# Patient Record
Sex: FEMALE | Race: WHITE | URBAN - METROPOLITAN AREA
[De-identification: names, ages, dates, MRNs, and addresses within clinical notes are randomized per-mention and may not be internally consistent; named-entity substitution may affect disease eponyms.]

---

## 2017-01-20 ENCOUNTER — TELEPHONE (OUTPATIENT)
Dept: FAMILY MEDICINE | Facility: CLINIC | Age: 62
End: 2017-01-20

## 2017-01-20 DIAGNOSIS — G89.4 CHRONIC PAIN SYNDROME: Primary | ICD-10-CM

## 2017-01-20 NOTE — TELEPHONE ENCOUNTER
MP  Patient failing on chronic pain quality list because chronic pain or chronic pain syndrome not listed on problem list.  Please advise.  Thanks, Lara Hough RN

## 2017-03-01 DIAGNOSIS — M15.0 PRIMARY OSTEOARTHRITIS INVOLVING MULTIPLE JOINTS: ICD-10-CM

## 2017-03-01 DIAGNOSIS — M54.50 CHRONIC BILATERAL LOW BACK PAIN WITHOUT SCIATICA: ICD-10-CM

## 2017-03-01 DIAGNOSIS — G89.29 CHRONIC BILATERAL LOW BACK PAIN WITHOUT SCIATICA: ICD-10-CM

## 2017-03-01 DIAGNOSIS — G89.4 CHRONIC PAIN SYNDROME: ICD-10-CM

## 2017-03-01 NOTE — TELEPHONE ENCOUNTER
Reason for Call:  Medication or medication refill:    Do you use a [facility]? Sabin of the pharmacy and phone number for the current request:  N/A    Name of the medication requested: Percocet    Other request: patient has made her pain med follow up for 3/14/17, but needs  A temp refill on her percocet until her appt has 3 pills left. Please let her know    Can we leave a detailed message on this number? YES    Phone number patient can be reached at: Home number on file [phone] (home)    Best Time: today    Call taken on 3/1/2017 at 8:41 AM by iAlyn Alexandra

## 2017-03-02 RX ORDER — OXYCODONE AND ACETAMINOPHEN 5; 325 MG/1; MG/1
1 TABLET ORAL EVERY 8 HOURS PRN
Qty: 30 TABLET | Refills: 0 | Status: CANCELLED | OUTPATIENT
Start: 2017-03-02

## 2017-03-02 RX ORDER — OXYCODONE AND ACETAMINOPHEN 5; 325 MG/1; MG/1
1 TABLET ORAL EVERY 4 HOURS PRN
Qty: 30 TABLET | Refills: 0 | Status: SHIPPED | OUTPATIENT
Start: 2017-03-02 | End: 2017-03-14

## 2017-03-02 NOTE — TELEPHONE ENCOUNTER
STEVE  Controlled Substance Refill Request for Percocet    Last refill: 2/1/2017 - #35    Last clinic visit: 12/1/2016     Clinic visit frequency required: Note from 12/1/16: Assessment and plan: Chronic pain. I agreed after discussion to raise the number to 35 for the next 3 months and then after that I will go back down to 30 pills a month. I will see her in 3 months.      Next appt: 3/14/2017    Controlled substance agreement on file: Yes:  Date 12/1/2016.    Documentation in problem list reviewed:  Yes    Processing:  Message below doesn't say if she will  or wants mailed will call patient    RX [place] reviewed:  reviewed- no concerns  [place]  [email or web address]  Please authorize if appropriate.  Thanks, Lara Hough RN

## 2017-03-07 ENCOUNTER — TELEPHONE (OUTPATIENT)
Dept: FAMILY MEDICINE | Facility: CLINIC | Age: 62
End: 2017-03-07

## 2017-03-14 ENCOUNTER — OFFICE VISIT (OUTPATIENT)
Dept: FAMILY MEDICINE | Facility: CLINIC | Age: 62
End: 2017-03-14
Payer: MEDICARE

## 2017-03-14 VITALS
DIASTOLIC BLOOD PRESSURE: 90 MMHG | TEMPERATURE: 97.4 F | BODY MASS INDEX: 38.61 KG/M2 | HEIGHT: 67 IN | WEIGHT: 246 LBS | OXYGEN SATURATION: 100 % | HEART RATE: 100 BPM | SYSTOLIC BLOOD PRESSURE: 140 MMHG | RESPIRATION RATE: 16 BRPM

## 2017-03-14 DIAGNOSIS — M54.50 CHRONIC BILATERAL LOW BACK PAIN WITHOUT SCIATICA: ICD-10-CM

## 2017-03-14 DIAGNOSIS — G43.009 MIGRAINE WITHOUT AURA AND WITHOUT STATUS MIGRAINOSUS, NOT INTRACTABLE: ICD-10-CM

## 2017-03-14 DIAGNOSIS — M15.0 PRIMARY OSTEOARTHRITIS INVOLVING MULTIPLE JOINTS: ICD-10-CM

## 2017-03-14 DIAGNOSIS — G44.209 MUSCLE CONTRACTION HEADACHE: ICD-10-CM

## 2017-03-14 DIAGNOSIS — K21.9 GASTROESOPHAGEAL REFLUX DISEASE WITHOUT ESOPHAGITIS: ICD-10-CM

## 2017-03-14 DIAGNOSIS — G89.29 CHRONIC BILATERAL LOW BACK PAIN WITHOUT SCIATICA: ICD-10-CM

## 2017-03-14 PROCEDURE — 99214 OFFICE O/P EST MOD 30 MIN: CPT | Performed by: FAMILY MEDICINE

## 2017-03-14 RX ORDER — SUMATRIPTAN 50 MG/1
50 TABLET, FILM COATED ORAL
Qty: 9 TABLET | Refills: 1 | Status: SHIPPED | OUTPATIENT
Start: 2017-03-14

## 2017-03-14 RX ORDER — OXYCODONE AND ACETAMINOPHEN 5; 325 MG/1; MG/1
1 TABLET ORAL EVERY 4 HOURS PRN
Qty: 30 TABLET | Refills: 0 | Status: SHIPPED | OUTPATIENT
Start: 2017-03-14 | End: 2017-06-21

## 2017-03-14 RX ORDER — ESOMEPRAZOLE MAGNESIUM 40 MG/1
40 CAPSULE, DELAYED RELEASE ORAL
Qty: 90 CAPSULE | Status: SHIPPED | OUTPATIENT
Start: 2017-03-14

## 2017-03-14 RX ORDER — OXYCODONE AND ACETAMINOPHEN 5; 325 MG/1; MG/1
1-2 TABLET ORAL EVERY 6 HOURS PRN
Qty: 30 TABLET | Refills: 0 | Status: SHIPPED | OUTPATIENT
Start: 2017-03-14

## 2017-03-14 RX ORDER — OXYCODONE AND ACETAMINOPHEN 5; 325 MG/1; MG/1
1 TABLET ORAL EVERY 4 HOURS PRN
Qty: 30 TABLET | Refills: 0 | Status: SHIPPED | OUTPATIENT
Start: 2017-03-14

## 2017-03-14 RX ORDER — CYCLOBENZAPRINE HCL 10 MG
TABLET ORAL
Qty: 180 TABLET | Status: SHIPPED | OUTPATIENT
Start: 2017-03-14

## 2017-03-14 NOTE — MR AVS SNAPSHOT
"              After Visit Summary   3/14/2017    Aleksandra Herzog    MRN: [id number]           Patient Information     Date Of Birth          1955        Visit Information        Provider Department      3/14/2017 11:00 AM Jun Senior MD [facility]        Today's Diagnoses     Primary osteoarthritis involving multiple joints        Migraine without aura and without status migrainosus, not intractable        Muscle contraction headache        Chronic bilateral low back pain without sciatica        Gastroesophageal reflux disease without esophagitis           Follow-ups after your visit        Who to contact     If you have questions or need follow up information about today's clinic visit or your schedule please contact [facility] directly at [phone].  Normal or non-critical lab and imaging results will be communicated to you by MyChart, letter or phone within 4 business days after the clinic has received the results. If you do not hear from us within 7 days, please contact the clinic through MyChart or phone. If you have a critical or abnormal lab result, we will notify you by phone as soon as possible.  Submit refill requests through [employer] or call your pharmacy and they will forward the refill request to us. Please allow 3 business days for your refill to be completed.          Additional Information About Your Visit        MyChart Information     GO-SIM lets you send messages to your doctor, view your test results, renew your prescriptions, schedule appointments and more. To sign up, go to [email or web address] . Click on \"Log in\" on the left side of the screen, which will take you to the Welcome page. Then click on \"Sign up Now\" on the right side of the page.     You will be asked to enter the access code listed below, as well as some personal information. Please follow the directions to create your username and password.     Your access code is: " "[email or web address]  Expires: 2017 11:26 AM     Your access code will  in 90 days. If you need help or a new code, please call your [facility] or [phone].        Care EveryWhere ID     This is your Care EveryWhere ID. This could be used by other organizations to access your [facility] medical records  [id number]        Your Vitals Were     Pulse Temperature Respirations Height Pulse Oximetry BMI (Body Mass Index)    100 97.4  F (36.3  C) (Oral) 16 5' 7\" (1.702 m) 100% 38.53 kg/m2       Blood Pressure from Last 3 Encounters:   17 140/90   16 (!) 142/94   16 134/90    Weight from Last 3 Encounters:   17 246 lb (111.6 kg)   16 246 lb (111.6 kg)   16 246 lb (111.6 kg)              Today, you had the following     No orders found for display         Where to get your medicines      These medications were sent to [facility] [address] [place] [address] AT [address] [address]    Hours:  24-hours Phone:  [phone]     cyclobenzaprine 10 MG tablet    esomeprazole 40 MG CR capsule    SUMAtriptan 50 MG tablet         Some of these will need a paper prescription and others can be bought over the counter.  Ask your nurse if you have questions.     Bring a paper prescription for each of these medications     isometheptene-dichloralphenazone-acetaminophen -325 MG per capsule    oxyCODONE-acetaminophen 5-325 MG per tablet    oxyCODONE-acetaminophen 5-325 MG per tablet    oxyCODONE-acetaminophen 5-325 MG per tablet          Primary Care Provider Office Phone # Fax #    Jun Senior [phone]       [facility] [address] [address]        Thank you!     Thank you for choosing [facility]  for your care. Our goal is always to provide you with excellent care. Hearing back from our patients is one way we can continue to improve our services. Please take " a few minutes to complete the written survey that you may receive in the mail after your visit with us. Thank you!             Your Updated Medication List - Protect others around you: Learn how to safely use, store and throw away your medicines at www.disposemymeds.org.          This list is accurate as of: 3/14/17 11:26 AM.  Always use your most recent med list.                   Brand Name Dispense Instructions for use    albuterol 108 (90 BASE) MCG/ACT Inhaler    PROAIR HFA/PROVENTIL HFA/VENTOLIN HFA    1 Inhaler    Inhale 2 puffs into the lungs every 6 hours as needed for shortness of breath / dyspnea       BUSPAR 10 MG tablet   Generic drug:  busPIRone     180 Tab    2 tabs three times a day       clonazePAM 0.5 MG tablet    klonoPIN         cyclobenzaprine 10 MG tablet    FLEXERIL    180 tablet    Take  by mouth. 1-2 ONCE OR TWICE DAILY       esomeprazole 40 MG CR capsule    nexIUM    90 capsule    Take 1 capsule (40 mg) by mouth every morning (before breakfast) Take 30-60 minutes before a eating.       gabapentin 600 MG tablet    NEURONTIN    150 tablet    Take  by mouth. Five capsules daily for osteoarthritis       isometheptene-dichloralphenazone-acetaminophen -325 MG per capsule    MIDRIN    60 capsule    Take 1 capsule by mouth every 4 hours as needed for headaches       order for DME     1 Units    Equipment being ordered: home traction unit       * oxyCODONE-acetaminophen 5-325 MG per tablet    PERCOCET    30 tablet    Take 1 tablet by mouth every 4 hours as needed for moderate to severe pain       * oxyCODONE-acetaminophen 5-325 MG per tablet    PERCOCET    30 tablet    Take 1 tablet by mouth every 4 hours as needed for moderate to severe pain       * oxyCODONE-acetaminophen 5-325 MG per tablet    PERCOCET    30 tablet    Take 1-2 tablets by mouth every 6 hours as needed for moderate to severe pain       SUMAtriptan 50 MG tablet    IMITREX    9 tablet    Take 1 tablet (50 mg) by mouth at onset  of headache for migraine May repeat dose in 2 hours.  Do not exceed 200 mg in 24 hours       VIIBRYD 20 MG Tabs tablet   Generic drug:  vilazodone      Take 1 tablet (20 mg) by mouth daily       * Notice:  This list has 3 medication(s) that are the same as other medications prescribed for you. Read the directions carefully, and ask your doctor or other care provider to review them with you.

## 2017-03-14 NOTE — PROGRESS NOTES
Subjective:three-month follow-up for chronic pain. She got a prescription 2 weeks ago for the next month but she is having a pain exacerbation in her low back right now, has used more than half of them and I agreed today to have the next refill started on the 20th of this month and then every month she can get 30 pills. She has been staying to our agreement of 30 pills per month for the most part, occasionally has asked for extra, does understand that she needs to work out a plan with the next doctor taking over for me. She has chronic severe depression, understands that if she has suicidal thoughts she really needs to talk to her psychiatrist rather than us, usually won't fill out her PHQ 9 for that reason. She knows that she is overdue for a number preventative healthcare things but she had a death in the family recently, her mother's sister, has been very difficult for her, happened in January. She doesn't feel ready to take up any of those preventative care things right now. She wants to wean off of the gabapentin because she thinks that it may be contributing to her increased sweating which really is a problem in the summer. It's much more tolerable in the winter.    Objective:  Normal thought processes and range of affect.    Assessment and plan: Chronic pain, multiple other issues, refilled medications, we'll plan on coming back in 3 months either here or she may switch to a different clinic, is considering going back to a pain clinic as well.    Over 25 min was spent with pt, and over half was in counseling

## 2017-03-15 ASSESSMENT — PATIENT HEALTH QUESTIONNAIRE - PHQ9: SUM OF ALL RESPONSES TO PHQ QUESTIONS 1-9: 27

## 2017-03-23 DIAGNOSIS — G43.009 MIGRAINE WITHOUT AURA AND WITHOUT STATUS MIGRAINOSUS, NOT INTRACTABLE: Primary | ICD-10-CM

## 2017-03-27 ENCOUNTER — TELEPHONE (OUTPATIENT)
Dept: FAMILY MEDICINE | Facility: CLINIC | Age: 62
End: 2017-03-27

## 2017-03-27 NOTE — TELEPHONE ENCOUNTER
Summary:    Patient is due/failing the following:   FIT, MAMMOGRAM and PAP    Type of outreach:  Sent letter.  Action needed: Patient needs office visit for physical/pap.    If need for provider review:    Please indicate OV, lab, MTM, or nurse appt if needed.  Indicate fasting or not fasting.                                                                                                                                          Rodney Birmingham ma

## 2017-03-27 NOTE — LETTER
[facility]  [address], [address]  [place], [place] [address]  [phone]     March 27, 2017     Aleksandra Herzog                                                                                                                               [address]     [facility] [address]        Dear Aleksandra,    We have noticed that you are currently overdue for your mammogram,pap smear and fit test( a test for colon cancer).    If you have had a mammogram in the past 2 years, please let us know so, we can update our records. You can send us a message via [employer] or call the clinic at [phone] to give us an update.     If you have not had a mammogram, we encourage you to schedule by contacting the   [facility] at [phone], or the [facility] at [phone].     If you are under/uninsured, we recommend you contact the [facility]. They offer mammograms on a sliding fee charge. You can schedule with them at [phone]. Ask them to send us the results.     Please make a appointment with  for the physical/pap [phone]    Please contact the clinic with any questions.    Sincerely,    Care Team for Jun Senior MD/mlb        Sincerely,      Clinic hours:  Monday   7:30 AM - 5:00 PM    Tuesday  7:00 AM - 7:00 PM    Wednesday  7:00 AM - 5:00 PM    Thursday  7:30 AM - 7:00 PM    Friday   7:30 AM - 5:00 PM

## 2017-05-05 DIAGNOSIS — G89.4 CHRONIC PAIN SYNDROME: ICD-10-CM

## 2017-05-05 RX ORDER — GABAPENTIN 600 MG/1
TABLET ORAL
Qty: 150 TABLET | Refills: 11 | Status: SHIPPED | OUTPATIENT
Start: 2017-05-05

## 2017-05-05 NOTE — TELEPHONE ENCOUNTER
Gabapentin      Last Written Prescription Date:  5-3-16  Last Fill Quantity: 150,   # refills: PRN  Last Office Visit with [facility], [facility] or  Health prescribing provider: 3-14-17  Future Office visit:       Routing refill request to provider for review/approval because:  Drug not on the [facility], [facility] or  Health refill protocol or controlled substance

## 2017-05-05 NOTE — TELEPHONE ENCOUNTER
MP,    Controlled Substance Refill Request for Gabapentin    Last refill: 3/30/2017 #150    Last clinic visit: 3/14/2017     Clinic visit frequency required: Q 3 months  Next appt: none    Controlled substance agreement on file: Yes:  Date 12/1/2016.    Documentation in problem list reviewed:  Yes    Processing:  [facility]    RX monitoring program ([place]) reviewed:  reviewed- no concerns  [place]  [email or web address]    Please authorize if appropriate.  Thanks,  Karen CONNOR RN

## 2017-05-05 NOTE — TELEPHONE ENCOUNTER
Pending Prescriptions:                       Disp   Refills    gabapentin (NEURONTIN) 600 MG tablet [Phar*150 ta*0        Sig: TAKE 5 TABLETS BY MOUTH DAILY          Last Written Prescription Date: 05/03/2017  Last Fill Quantity: 150,  # refills: PRN   Last Office Visit with [facility], [facility] or [facility] prescribing provider: 03/14/2017

## 2017-05-16 DIAGNOSIS — G44.209 MUSCLE CONTRACTION HEADACHE: ICD-10-CM

## 2017-05-16 NOTE — TELEPHONE ENCOUNTER
Reason for Call:  Medication or medication refill:    Do you use a [facility]?  Name of the pharmacy and phone number for the current request:  [facility] [address] [place] [address] AT [facility]      Name of the medication requested: isometheptene-dichloralphenazone-acetaminophen (MIDRIN)    Other request: pt needs rx refill for isometheptene-dichloralphenazone-acetaminophen (MIDRIN). Please advise    Can we leave a detailed message on this number? YES    Phone number patient can be reached at: Home number on file [phone] (home)    Best Time: any    Call taken on 5/16/2017 at 2:46 PM by Stanford Skaggs

## 2017-05-16 NOTE — TELEPHONE ENCOUNTER
Controlled Substance Refill Request for Midrin  Problem List Complete:  Yes  Medication(s): percocet   Maximum quantity per month: 30   Clinic visit frequency required: Q 3 months      Controlled substance agreement:      Encounter-Level CSA - 12/1/16:                   Controlled Substance Agreement - Scan on 12/14/2016 12:26 PM : CONTROLLED SUBSTANCE AGREEMENT (below)          Pain Clinic evaluation in the past: No     DIRE Total Score(s):  No flowsheet data found.     Last [facility] website verification: done on 5/5/2017    Routing refill request to provider for review/approval because:  Drug not on the [facility], [facility] or  Health refill protocol or controlled substance    Yajaira Anthony RN - Triage [employer]

## 2017-05-23 ENCOUNTER — TELEPHONE (OUTPATIENT)
Dept: FAMILY MEDICINE | Facility: CLINIC | Age: 62
End: 2017-05-23

## 2017-05-23 NOTE — TELEPHONE ENCOUNTER
Do we have an option of trying for PA? It IS indicated for muscle dontraction headaches I believe and the other option is more narcotics which we are trying to avoid

## 2017-05-23 NOTE — TELEPHONE ENCOUNTER
Ирина denied coverage of Midrin.  Reason: Medication not FDA approved for this dx.  Ирина ph# [phone]  ID# [id number]  Gillian ph# [phone]

## 2017-05-26 NOTE — TELEPHONE ENCOUNTER
Spoke with pharmacist at [facility].  Medication is excluded for coverage by Medicare Part D by law.  She was very firm. No exceptions.

## 2017-05-26 NOTE — TELEPHONE ENCOUNTER
Please let pt know we tried but they will not cover it-would have to pay out of pocket. There is nothing similar I can rx

## 2017-06-03 ENCOUNTER — HEALTH MAINTENANCE LETTER (OUTPATIENT)
Age: 62
End: 2017-06-03

## 2017-06-21 ENCOUNTER — OFFICE VISIT (OUTPATIENT)
Dept: FAMILY MEDICINE | Facility: CLINIC | Age: 62
End: 2017-06-21
Payer: MEDICARE

## 2017-06-21 VITALS
SYSTOLIC BLOOD PRESSURE: 160 MMHG | TEMPERATURE: 98.3 F | DIASTOLIC BLOOD PRESSURE: 100 MMHG | WEIGHT: 256.5 LBS | HEART RATE: 114 BPM | BODY MASS INDEX: 40.26 KG/M2 | OXYGEN SATURATION: 97 % | HEIGHT: 67 IN

## 2017-06-21 DIAGNOSIS — J45.31 MILD PERSISTENT ASTHMA WITH EXACERBATION: ICD-10-CM

## 2017-06-21 DIAGNOSIS — J98.01 BRONCHOSPASM: Primary | ICD-10-CM

## 2017-06-21 DIAGNOSIS — I10 BENIGN ESSENTIAL HYPERTENSION: ICD-10-CM

## 2017-06-21 DIAGNOSIS — Z12.11 SCREEN FOR COLON CANCER: ICD-10-CM

## 2017-06-21 DIAGNOSIS — Z72.0 TOBACCO ABUSE: ICD-10-CM

## 2017-06-21 DIAGNOSIS — M15.0 PRIMARY OSTEOARTHRITIS INVOLVING MULTIPLE JOINTS: ICD-10-CM

## 2017-06-21 DIAGNOSIS — Z12.39 BREAST CANCER SCREENING: ICD-10-CM

## 2017-06-21 DIAGNOSIS — K21.9 GASTROESOPHAGEAL REFLUX DISEASE WITHOUT ESOPHAGITIS: ICD-10-CM

## 2017-06-21 DIAGNOSIS — F17.200 TOBACCO USE DISORDER: ICD-10-CM

## 2017-06-21 DIAGNOSIS — C50.411 MALIGNANT NEOPLASM OF UPPER-OUTER QUADRANT OF RIGHT FEMALE BREAST, UNSPECIFIED ESTROGEN RECEPTOR STATUS (H): ICD-10-CM

## 2017-06-21 DIAGNOSIS — Z11.59 NEED FOR HEPATITIS C SCREENING TEST: ICD-10-CM

## 2017-06-21 DIAGNOSIS — Z12.4 SCREENING FOR MALIGNANT NEOPLASM OF CERVIX: ICD-10-CM

## 2017-06-21 PROCEDURE — 80048 BASIC METABOLIC PNL TOTAL CA: CPT | Performed by: FAMILY MEDICINE

## 2017-06-21 PROCEDURE — 84443 ASSAY THYROID STIM HORMONE: CPT | Performed by: FAMILY MEDICINE

## 2017-06-21 PROCEDURE — 99214 OFFICE O/P EST MOD 30 MIN: CPT | Performed by: FAMILY MEDICINE

## 2017-06-21 PROCEDURE — 36415 COLL VENOUS BLD VENIPUNCTURE: CPT | Performed by: FAMILY MEDICINE

## 2017-06-21 PROCEDURE — G0472 HEP C SCREEN HIGH RISK/OTHER: HCPCS | Performed by: FAMILY MEDICINE

## 2017-06-21 PROCEDURE — 86803 HEPATITIS C AB TEST: CPT | Performed by: FAMILY MEDICINE

## 2017-06-21 RX ORDER — OXYCODONE AND ACETAMINOPHEN 5; 325 MG/1; MG/1
1 TABLET ORAL DAILY PRN
Qty: 30 TABLET | Refills: 0 | Status: SHIPPED | OUTPATIENT
Start: 2017-07-21 | End: 2017-06-21

## 2017-06-21 RX ORDER — OXYCODONE AND ACETAMINOPHEN 5; 325 MG/1; MG/1
1 TABLET ORAL DAILY PRN
Qty: 30 TABLET | Refills: 0 | Status: SHIPPED | OUTPATIENT
Start: 2017-08-21

## 2017-06-21 RX ORDER — OXYCODONE AND ACETAMINOPHEN 5; 325 MG/1; MG/1
1 TABLET ORAL DAILY PRN
Qty: 30 TABLET | Refills: 0 | Status: SHIPPED | OUTPATIENT
Start: 2017-06-21 | End: 2017-06-21

## 2017-06-21 NOTE — PROGRESS NOTES
SUBJECTIVE:                                                    Aleksandra Herzog is a 62 year old female who presents to clinic today for the following health issues:    She reports she is used to getting prescription every 3 months from  Previous primary care physicain  Wants to continue same  No other medications help  Takes it responsibly.  She has been staying to an  agreement of 30 pills per month for the most part,   occasionally has asked for extra,     The need for pain medications started about 9 years ago.  Had Rotator cuff repair in 2008,Needed percocet then  Followed by arthritis and since then has not been able to stop percoet  On going moderate to sever pain from   osteoarthritis in both shoulder and knee        Was managed at Pain clinic- after breast cancer- S/P right lumpectomy    History Chronic major depression - was under care of psychiatrist at [facility]- who left  She is stressed to find a new psychiatrist  Just finished computer classes to find a job, so feels in a corner because of finanical and rent is being raised  She has been on SSD-since 1994    She smokes  She gets annual physical at [facility] but has not been back for sometine    Had more sweating with gabapentin- tried to wean off    Medication Followup of Percocet 5-325 mg     Taking Medication as prescribed: yes    Side Effects:  None    Medication Helping Symptoms:  yes       PROBLEMS TO ADD ON...    Problem list and histories reviewed & adjusted, as indicated.  Additional history: as documented    Labs reviewed in EPIC    Reviewed and updated as needed this visit by clinical staff       Reviewed and updated as needed this visit by Provider         ROS:  Constitutional, HEENT, cardiovascular, pulmonary, gi and gu systems are negative, except as otherwise noted.    OBJECTIVE:                                                    BP (!) 160/100 (BP Location: Left arm, Patient Position: Chair, Cuff Size: Adult Regular)  Pulse 114  Temp 98.3  " F (36.8  C) (Oral)  Ht 5' 7\" (1.702 m)  Wt 256 lb 8 oz (116.3 kg)  SpO2 97%  Breastfeeding? No  BMI 40.17 kg/m2  Body mass index is 40.17 kg/(m^2).  GENERAL: healthy, alert and no distress  NECK: no adenopathy, no asymmetry, masses, or scars and thyroid normal to palpation  RESP: lungs clear to auscultation - no rales, rhonchi or wheezes  CV: regular rate and rhythm, normal S1 S2, no S3 or S4, no murmur, click or rub, no peripheral edema and peripheral pulses strong  ABDOMEN: soft, nontender, no hepatosplenomegaly, no masses and bowel sounds normal  MS: no gross musculoskeletal defects noted, no edema    Diagnostic Test Results:     ASSESSMENT/PLAN:                                                      (M15.0) Primary osteoarthritis involving multiple joints- (primary encounter diagnosis)  Plan:  Chronic pain, multiple issues, refilled medications,   Start to find MD at pain clinic for further management.- till then will continue with  3 months   discussed in detail, the need for on going efforts to try and taper off the narcotics-         oxyCODONE-acetaminophen (PERCOCET) 5-325 MG per tablet once daily as needed - 3 months prescription provided  Return office visit in 3 month          (I10) Benign essential hypertension  Plan: uncontrolled/untreated- she declined new medications for mani treatment- she reports she is too sensitive to start a new medications  Will have it rechecked either nurse only appointment or outside and advised to contact MD- if continues to have high Blood pressure   Basic metabolic panel, TSH with free T4 reflex                (Z11.59) Need for hepatitis C screening test  Plan: Hepatitis C Screen Reflex to HCV RNA Quant and         Genotype      (F17.200) Tobacco use disorder  Plan: no readiness for smoke cessation- that's is encouarged    (Z12.11) Screen for colon cancer  Plan: will try and do Fecal colorectal cancer screen (FIT)  Unwilling to consider colonoscopy that we discussed is " the current standard for colon cancer screening    (Z12.4) Screening for malignant neoplasm of cervix*  And   (Z12.39) Breast cancer screening  Declined both- pap smear,  Mammogram,    hcv screening completed today    Given history of breast cancer- she be under care of oncologist as well- she declined- reports too financially strained      20 out of 25 mins spend in above counsleing            Opal Lara MD  [facility]

## 2017-06-21 NOTE — PATIENT INSTRUCTIONS
Pain management clinic  And if follow though [facility]    Your Blood pressure is very high and puts you at risk for stroke , [place]  You should be under care of oncologist for history of breast cancer- 2009  You need to have pap smear,  mammogram, colon cancer screening    Please establish care with a new provider of your choice  You mentioned you were going to [facility] for your physical and psychiatric   HOW TO QUIT SMOKING  Smoking is one of the hardest habits to break. About half of all those who have ever smoked have been able to quit, and most of those (about 70%) who still smoke want to quit. Here are some of the best ways to stop smoking.     KEEP TRYING:  It takes most smokers about 8 tries before they are finally able to fully quit. So, the more often you try and fail, the better your chance of quitting the next time! So, don't give up!    GO COLD TURKEY:  Most ex-smokers quit cold turkey. Trying to cut back gradually doesn't seem to work as well, perhaps because it continues the smoking habit. Also, it is possible to fool yourself by inhaling more while smoking fewer cigarettes. This results in the same amount of nicotine in your body!    GET SUPPORT:  Support programs can make an important difference, especially for the heavy smoker. These groups offer lectures, methods to change your behavior and peer support. Call the free national Quitline for more information. 800-QUIT-NOW (469-953-8250). Low-cost or free programs are offered by many hospitals, local chapters of the American Lung Association (929-555-5353) and the American Cancer Society (067-357-1961). Support at home is important too. Non-smokers can help by offering praise and encouragement. If the smoker fails to quit, encourage them to try again!    OVER-THE-COUNTER MEDICINES:  For those who can't quit on their own, Nicotine Replacement Therapy (NRT) may make quitting much easier. Certain aids such as the nicotine patch, gum and  lozenge are available without a prescription. However, it is best to use these under the guidance of your doctor. The skin patch provides a steady supply of nicotine to the body. Nicotine gum and lozenge gives temporary bursts of low levels of nicotine. Both methods take the edge off the craving for cigarettes. WARNING: If you feel symptoms of nicotine overdose, such as nausea, vomiting, dizziness, weakness, or fast heartbeat, stop using these and see your doctor.    PRESCRIPTION MEDICINES:  After evaluating your smoking patterns and prior attempts at quitting, your doctor may offer a prescription medicine such as bupropion (Zyban, Wellbutrin), varenicline (Chantix, Champix), a niocotine inhaler or nasal spray. Each has its unique advantage and side effects which your doctor can review with you.    HEALTH BENEFITS OF QUITTING:  The benefits of quitting start right away and keep improving the longer you go without smokin minutes: blood pressure and pulse return to normal  8 hours: oxygen levels return to normal  2 days: ability to smell and taste begins to improve as damaged nerves start to regrow  2-3 weeks: circulation and lung function improves  1-9 months: decreased cough, congestion and shortness of breath; less tired  1 year: risk of heart attack decreases by half  5 years: risk of lung cancer decreases by half; risk of stroke becomes the same as a non-smoker  For information about how to quit smoking, visit the following links:  National Cancer [place] ,   Clearing the Air, Quit Smoking Today   - an online booklet. http://www.smokefree.gov/pubs/clearing_the_air.pdf  Smokefree.gov http://smokefree.gov/  QuitNet http://www.quitnet.com/    1136-2878 Lizandro Melendrez, [address] [place] [address], [place] [address]. All rights reserved. This information is not intended as a substitute for professional medical care. Always follow your healthcare professional's instructions.    The Benefits of Living Smoke  Free  What do you want to gain from quitting? Check off some reasons to quit.  Health Benefits  ___ Reduce my risk of lung cancer, heart disease, chronic lung disease  ___ Have fewer wrinkles and softer skin  ___ Improve my sense of taste and smell  ___ For pregnant women--reduce the risk of having a miscarriage, stillbirth, premature birth, or low-birth-weight baby  Personal Benefits  ___ Feel more in control of my life  ___ Have better-smelling hair, breath, clothes, home, and car  ___ Save time by not having to take smoke breaks, buy cigarettes, or hunt for a light  ___ Have whiter teeth  Family Benefits  ___ Reduce my children s respiratory tract infections  ___ Set a good example for my children  ___ Reduce my family s cancer risk  Financial Benefits  ___ Save hundreds of dollars each year that would be spent on cigarettes  ___ Save money on medical bills  ___ Save on life, health, and car insurance premiums    Those Dollars Add Up!  Cigarettes are expensive, and getting more expensive all the time. Do you realize how much money you are spending on cigarettes per year? What is the average amount you spend on a pack of cigarettes? What is the average number of packs that you smoke per day? Using your answers to these questions, fill in this formula to help you find out:  ($ _____ per pack) ×  ( _____ number of packs per day) × (365 days) =  $ _____ yearly cost of smoking  Besides tobacco, there are other costs, including extra cleaning bills and replacement costs for clothing and furniture; medical expenses for smoking-related illnesses; and higher health, life, and car insurance premiums.    Cigars and Pipes Count Too!  Cigars and pipes are also dangerous. So are smokeless (chewing) tobacco and snuff. All of these products contain nicotine, a highly addictive substance that has harmful effects on your body. Quitting smoking means giving up all tobacco products.      1046-7631 Lizadnro Melendrez, [address] [place] [address]  Road, [place] [address]. All rights reserved. This information is not intended as a substitute for professional medical care. Always follow your healthcare professional's instructions.

## 2017-06-22 LAB
ANION GAP SERPL CALCULATED.3IONS-SCNC: 9 MMOL/L (ref 3–14)
BUN SERPL-MCNC: 13 MG/DL (ref 7–30)
CALCIUM SERPL-MCNC: 9 MG/DL (ref 8.5–10.1)
CHLORIDE SERPL-SCNC: 107 MMOL/L (ref 94–109)
CO2 SERPL-SCNC: 23 MMOL/L (ref 20–32)
CREAT SERPL-MCNC: 0.84 MG/DL (ref 0.52–1.04)
GFR SERPL CREATININE-BSD FRML MDRD: 68 ML/MIN/1.7M2
GLUCOSE SERPL-MCNC: 86 MG/DL (ref 70–99)
HCV AB SERPL QL IA: NORMAL
POTASSIUM SERPL-SCNC: 4.3 MMOL/L (ref 3.4–5.3)
SODIUM SERPL-SCNC: 139 MMOL/L (ref 133–144)
TSH SERPL DL<=0.005 MIU/L-ACNC: 2.31 MU/L (ref 0.4–4)

## 2017-06-24 NOTE — PROGRESS NOTES
Send lab & letter    -Kidney function is normal (Cr, GFR), Sodium is normal, Potassium is normal, Calcium is normal, Glucose is normal (diabetes screening test).   -TSH (thyroid stimulating hormone) level is normal which indicates normal thyroid function.  --HEPATITIS C VIRUS    negative- that's good    Please keep us posted with questions or concerns .      Best Regards,    Opal Lara MD  [facility]  [phone]

## 2017-09-29 ENCOUNTER — TRANSFERRED RECORDS (OUTPATIENT)
Dept: HEALTH INFORMATION MANAGEMENT | Facility: CLINIC | Age: 62
End: 2017-09-29

## 2017-11-06 NOTE — MR AVS SNAPSHOT
After Visit Summary   6/21/2017    Aleksandra Herzog    MRN: [id number]           Patient Information     Date Of Birth          1955        Visit Information        Provider Department      6/21/2017 12:00 PM Opal Lara MD [facility]        Today's Diagnoses     Bronchospasm    -  1    Benign essential hypertension        Excess, secretion, sweat        Tobacco abuse        Need for hepatitis C screening test        Tobacco use disorder        Screen for colon cancer        Screening for malignant neoplasm of cervix        Breast cancer screening        Primary osteoarthritis involving multiple joints          Care Instructions    Pain management clinic  And if follow though [facility]    Your Blood pressure is very high and puts you at risk for stroke , [place]  You should be under care of oncologist for history of breast cancer- 2009  You need to have pap smear, left mammogram, colon cancer screening    Please establish care with a new provider of your choice  You mentioned you were going to [facility] physical and psychiatric   HOW TO QUIT SMOKING  Smoking is one of the hardest habits to break. About half of all those who have ever smoked have been able to quit, and most of those (about 70%) who still smoke want to quit. Here are some of the best ways to stop smoking.     KEEP TRYING:  It takes most smokers about 8 tries before they are finally able to fully quit. So, the more often you try and fail, the better your chance of quitting the next time! So, don't give up!    GO COLD TURKEY:  Most ex-smokers quit cold turkey. Trying to cut back gradually doesn't seem to work as well, perhaps because it continues the smoking habit. Also, it is possible to fool yourself by inhaling more while smoking fewer cigarettes. This results in the same amount of nicotine in your body!    GET SUPPORT:  Support programs can make an important difference, especially for  the heavy smoker. These groups offer lectures, methods to change your behavior and peer support. Call the free national Quitline for more information. 800-QUIT-NOW (005-229-8443). Low-cost or free programs are offered by many hospitals, local chapters of the American Lung Association (267-519-2421) and the American Cancer Society (553-811-8883). Support at home is important too. Non-smokers can help by offering praise and encouragement. If the smoker fails to quit, encourage them to try again!    OVER-THE-COUNTER MEDICINES:  For those who can't quit on their own, Nicotine Replacement Therapy (NRT) may make quitting much easier. Certain aids such as the nicotine patch, gum and lozenge are available without a prescription. However, it is best to use these under the guidance of your doctor. The skin patch provides a steady supply of nicotine to the body. Nicotine gum and lozenge gives temporary bursts of low levels of nicotine. Both methods take the edge off the craving for cigarettes. WARNING: If you feel symptoms of nicotine overdose, such as nausea, vomiting, dizziness, weakness, or fast heartbeat, stop using these and see your doctor.    PRESCRIPTION MEDICINES:  After evaluating your smoking patterns and prior attempts at quitting, your doctor may offer a prescription medicine such as bupropion (Zyban, Wellbutrin), varenicline (Chantix, Champix), a niocotine inhaler or nasal spray. Each has its unique advantage and side effects which your doctor can review with you.    HEALTH BENEFITS OF QUITTING:  The benefits of quitting start right away and keep improving the longer you go without smokin minutes: blood pressure and pulse return to normal  8 hours: oxygen levels return to normal  2 days: ability to smell and taste begins to improve as damaged nerves start to regrow  2-3 weeks: circulation and lung function improves  1-9 months: decreased cough, congestion and shortness of breath; less tired  1 year: risk of  heart attack decreases by half  5 years: risk of lung cancer decreases by half; risk of stroke becomes the same as a non-smoker  For information about how to quit smoking, visit the following links:  National Cancer [place] ,   Clearing the Air, Quit Smoking Today   - an online booklet. http://www.smokefree.gov/pubs/clearing_the_air.pdf  Smokefree.gov http://smokefree.gov/  QuitNet http://www.quitnet.com/    4134-3098 Lizandro Melendrez, [address] [place] [address], [place] [address]. All rights reserved. This information is not intended as a substitute for professional medical care. Always follow your healthcare professional's instructions.    The Benefits of Living Smoke Free  What do you want to gain from quitting? Check off some reasons to quit.  Health Benefits  ___ Reduce my risk of lung cancer, heart disease, chronic lung disease  ___ Have fewer wrinkles and softer skin  ___ Improve my sense of taste and smell  ___ For pregnant women--reduce the risk of having a miscarriage, stillbirth, premature birth, or low-birth-weight baby  Personal Benefits  ___ Feel more in control of my life  ___ Have better-smelling hair, breath, clothes, home, and car  ___ Save time by not having to take smoke breaks, buy cigarettes, or hunt for a light  ___ Have whiter teeth  Family Benefits  ___ Reduce my children s respiratory tract infections  ___ Set a good example for my children  ___ Reduce my family s cancer risk  Financial Benefits  ___ Save hundreds of dollars each year that would be spent on cigarettes  ___ Save money on medical bills  ___ Save on life, health, and car insurance premiums    Those Dollars Add Up!  Cigarettes are expensive, and getting more expensive all the time. Do you realize how much money you are spending on cigarettes per year? What is the average amount you spend on a pack of cigarettes? What is the average number of packs that you smoke per day? Using your answers to these questions, fill in this formula  to help you find out:  ($ _____ per pack) ×  ( _____ number of packs per day) × (365 days) =  $ _____ yearly cost of smoking  Besides tobacco, there are other costs, including extra cleaning bills and replacement costs for clothing and furniture; medical expenses for smoking-related illnesses; and higher health, life, and car insurance premiums.    Cigars and Pipes Count Too!  Cigars and pipes are also dangerous. So are smokeless (chewing) tobacco and snuff. All of these products contain nicotine, a highly addictive substance that has harmful effects on your body. Quitting smoking means giving up all tobacco products.      6111-3555 [facility], [address] [place] [address], [place] [address]. All rights reserved. This information is not intended as a substitute for professional medical care. Always follow your healthcare professional's instructions.          Follow-ups after your visit        Future tests that were ordered for you today     Open Future Orders        Priority Expected Expires Ordered    Fecal colorectal cancer screen (FIT) Routine 6/21/2017 9/13/2017 6/21/2017            Who to contact     If you have questions or need follow up information about today's clinic visit or your schedule please contact [facility] [place] directly at [phone].  Normal or non-critical lab and imaging results will be communicated to you by [employer], letter or phone within 4 business days after the clinic has received the results. If you do not hear from us within 7 days, please contact the clinic through [employer] or phone. If you have a critical or abnormal lab result, we will notify you by phone as soon as possible.  Submit refill requests through [employer] or call your pharmacy and they will forward the refill request to us. Please allow 3 business days for your refill to be completed.          Additional Information About Your Visit        [employer] Information     [employer] lets you send messages to your doctor, view  "your test results, renew your prescriptions, schedule appointments and more. To sign up, go to [email or web address] . Click on \"Log in\" on the left side of the screen, which will take you to the Welcome page. Then click on \"Sign up Now\" on the right side of the page.     You will be asked to enter the access code listed below, as well as some personal information. Please follow the directions to create your username and password.     Your access code is: [email or web address]  Expires: 2017  1:03 PM     Your access code will  in 90 days. If you need help or a new code, please call your [facility] clinic or [phone].        Care EveryWhere ID     This is your Care EveryWhere ID. This could be used by other organizations to access your [place] medical records  [id number]        Your Vitals Were     Pulse Temperature Height Pulse Oximetry Breastfeeding? BMI (Body Mass Index)    114 98.3  F (36.8  C) (Oral) 5' 7\" (1.702 m) 97% No 40.17 kg/m2       Blood Pressure from Last 3 Encounters:   17 (!) 160/100   17 140/90   16 (!) 142/94    Weight from Last 3 Encounters:   17 256 lb 8 oz (116.3 kg)   17 246 lb (111.6 kg)   16 246 lb (111.6 kg)              We Performed the Following     Basic metabolic panel     Hepatitis C Screen Reflex to HCV RNA Quant and Genotype     TSH with free T4 reflex          Today's Medication Changes          These changes are accurate as of: 17  1:03 PM.  If you have any questions, ask your nurse or doctor.               These medicines have changed or have updated prescriptions.        Dose/Directions    * oxyCODONE-acetaminophen 5-325 MG per tablet   Commonly known as:  PERCOCET   This may have changed:  Another medication with the same name was added. Make sure you understand how and when to take each.   Used for:  Primary osteoarthritis involving multiple joints   Changed by:  Jun Senior MD        Dose:  1 tablet   Take 1 tablet by " mouth every 4 hours as needed for moderate to severe pain   Quantity:  30 tablet   Refills:  0       * oxyCODONE-acetaminophen 5-325 MG per tablet   Commonly known as:  PERCOCET   This may have changed:  Another medication with the same name was added. Make sure you understand how and when to take each.   Used for:  Primary osteoarthritis involving multiple joints   Changed by:  Jun Senior MD        Dose:  1-2 tablet   Take 1-2 tablets by mouth every 6 hours as needed for moderate to severe pain   Quantity:  30 tablet   Refills:  0       * oxyCODONE-acetaminophen 5-325 MG per tablet   Commonly known as:  PERCOCET   This may have changed:  You were already taking a medication with the same name, and this prescription was added. Make sure you understand how and when to take each.   Used for:  Primary osteoarthritis involving multiple joints   Changed by:  Opal Lara MD        Dose:  1 tablet   Start taking on:  8/21/2017   Take 1 tablet by mouth daily as needed for moderate to severe pain   Quantity:  30 tablet   Refills:  0       * Notice:  This list has 3 medication(s) that are the same as other medications prescribed for you. Read the directions carefully, and ask your doctor or other care provider to review them with you.         Where to get your medicines      Some of these will need a paper prescription and others can be bought over the counter.  Ask your nurse if you have questions.     Bring a paper prescription for each of these medications     oxyCODONE-acetaminophen 5-325 MG per tablet                Primary Care Provider Office Phone # Fax #    Jun Senior [phone]       [place] [phone]        Equal Access to Services     [facility]: Vilma wolff [place], waaxda luqadaha, qaybta kaalmaluis figueroa. So [facility] [phone].    ATENCIÓN: Alber merrill,  tiene a tobar disposición servicios gratuitos de asistencia lingüística. Bacilio austin [phone].    We comply with applicable federal civil rights laws and [place] laws. We do not discriminate on the basis of race, color, national origin, age, disability sex, sexual orientation or gender identity.            Thank you!     Thank you for choosing [facility]  for your care. Our goal is always to provide you with excellent care. Hearing back from our patients is one way we can continue to improve our services. Please take a few minutes to complete the written survey that you may receive in the mail after your visit with us. Thank you!             Your Updated Medication List - Protect others around you: Learn how to safely use, store and throw away your medicines at www.disposemymeds.org.          This list is accurate as of: 6/21/17  1:03 PM.  Always use your most recent med list.                   Brand Name Dispense Instructions for use Diagnosis    albuterol 108 (90 BASE) MCG/ACT Inhaler    PROAIR HFA/PROVENTIL HFA/VENTOLIN HFA    1 Inhaler    Inhale 2 puffs into the lungs every 6 hours as needed for shortness of breath / dyspnea    Bronchospasm       BUSPAR 10 MG tablet   Generic drug:  busPIRone     180 Tab    2 tabs three times a day    Moderate major depression (H)       clonazePAM 0.5 MG tablet    klonoPIN          cyclobenzaprine 10 MG tablet    FLEXERIL    180 tablet    Take  by mouth. 1-2 ONCE OR TWICE DAILY    Chronic bilateral low back pain without sciatica       esomeprazole 40 MG CR capsule    nexIUM    90 capsule    Take 1 capsule (40 mg) by mouth every morning (before breakfast) Take 30-60 minutes before a eating.    Gastroesophageal reflux disease without esophagitis       gabapentin 600 MG tablet    NEURONTIN    150 tablet    TAKE 5 TABLETS BY MOUTH DAILY    Osteoarthrosis involving lower leg       order for DME     1 Units    Equipment being ordered: home traction unit    Shoulder joint  pain, unspecified laterality, Neck pain       * oxyCODONE-acetaminophen 5-325 MG per tablet    PERCOCET    30 tablet    Take 1 tablet by mouth every 4 hours as needed for moderate to severe pain    Primary osteoarthritis involving multiple joints       * oxyCODONE-acetaminophen 5-325 MG per tablet    PERCOCET    30 tablet    Take 1-2 tablets by mouth every 6 hours as needed for moderate to severe pain    Primary osteoarthritis involving multiple joints       * oxyCODONE-acetaminophen 5-325 MG per tablet   Start taking on:  8/21/2017    PERCOCET    30 tablet    Take 1 tablet by mouth daily as needed for moderate to severe pain    Primary osteoarthritis involving multiple joints       SUMAtriptan 50 MG tablet    IMITREX    9 tablet    Take 1 tablet (50 mg) by mouth at onset of headache for migraine May repeat dose in 2 hours.  Do not exceed 200 mg in 24 hours    Migraine without aura and without status migrainosus, not intractable       VIIBRYD 20 MG Tabs tablet   Generic drug:  vilazodone      Take 1 tablet (20 mg) by mouth daily    Major depressive disorder, recurrent episode, moderate (H)       * Notice:  This list has 3 medication(s) that are the same as other medications prescribed for you. Read the directions carefully, and ask your doctor or other care provider to review them with you.       Yes

## 2018-03-15 ENCOUNTER — TELEPHONE (OUTPATIENT)
Dept: FAMILY MEDICINE | Facility: CLINIC | Age: 63
End: 2018-03-15

## 2018-06-09 ENCOUNTER — HEALTH MAINTENANCE LETTER (OUTPATIENT)
Age: 63
End: 2018-06-09

## 2018-11-12 NOTE — NURSING NOTE
"Chief Complaint   Patient presents with     Recheck Medication       Initial BP (!) 160/100 (BP Location: Left arm, Patient Position: Chair, Cuff Size: Adult Regular)  Pulse 114  Temp 98.3  F (36.8  C) (Oral)  Ht 5' 7\" (1.702 m)  Wt 256 lb 8 oz (116.3 kg)  SpO2 97%  Breastfeeding? No  BMI 40.17 kg/m2 Estimated body mass index is 40.17 kg/(m^2) as calculated from the following:    Height as of this encounter: 5' 7\" (1.702 m).    Weight as of this encounter: 256 lb 8 oz (116.3 kg).  Medication Reconciliation: complete     Ramona Gamble MA       "
normal (ped)...

## 2023-03-21 NOTE — TELEPHONE ENCOUNTER
3/15/2018    Attempt 1    Contacted patient in regards to scheduling VIP mammogram  Message NO VOICEMAIL    Patient is also due for - Preventive Health Screening Colonoscopy    Comments:       Outreach   PONCHO LOFTON      
3/21/2018    Attempt 2     Contacted patient in regards to scheduling VIP mammogram  Message voicemail     Patient is also due for - Preventive Health Screening Colonoscopy     Comments:         Outreach   PONCHO LOFTON  
The Patient declined Preventive Health Screens for: mammogram screening   Please review chart and follow-up with patient if needed.    Thank you      Outreach ,  Radha Bonds                  
rolling walker